# Patient Record
Sex: FEMALE | Race: WHITE | NOT HISPANIC OR LATINO
[De-identification: names, ages, dates, MRNs, and addresses within clinical notes are randomized per-mention and may not be internally consistent; named-entity substitution may affect disease eponyms.]

---

## 2017-02-08 ENCOUNTER — APPOINTMENT (OUTPATIENT)
Dept: INTERNAL MEDICINE | Facility: CLINIC | Age: 72
End: 2017-02-08

## 2017-02-08 VITALS — HEART RATE: 66 BPM | DIASTOLIC BLOOD PRESSURE: 74 MMHG | SYSTOLIC BLOOD PRESSURE: 148 MMHG | RESPIRATION RATE: 12 BRPM

## 2017-02-08 VITALS — BODY MASS INDEX: 20.4 KG/M2 | WEIGHT: 130 LBS | HEIGHT: 67 IN

## 2017-02-08 DIAGNOSIS — F17.200 NICOTINE DEPENDENCE, UNSPECIFIED, UNCOMPLICATED: ICD-10-CM

## 2017-02-08 DIAGNOSIS — H61.21 IMPACTED CERUMEN, RIGHT EAR: ICD-10-CM

## 2017-02-08 RX ORDER — VIT A/VIT C/VIT E/ZINC/COPPER 4296-226
CAPSULE ORAL DAILY
Qty: 30 | Refills: 0 | Status: ACTIVE | COMMUNITY
Start: 2017-02-08

## 2017-02-08 RX ORDER — PREDNISONE 2.5 MG/1
2.5 TABLET ORAL
Qty: 90 | Refills: 0 | Status: COMPLETED | COMMUNITY
Start: 2017-01-03

## 2017-02-08 RX ORDER — DIPHENOXYLATE HYDROCHLORIDE AND ATROPINE SULFATE 2.5; .025 MG/1; MG/1
2.5-0.025 TABLET ORAL
Qty: 30 | Refills: 0 | Status: ACTIVE | COMMUNITY
Start: 2016-11-15

## 2017-03-07 ENCOUNTER — RX RENEWAL (OUTPATIENT)
Age: 72
End: 2017-03-07

## 2017-11-21 ENCOUNTER — APPOINTMENT (OUTPATIENT)
Dept: INTERNAL MEDICINE | Facility: CLINIC | Age: 72
End: 2017-11-21
Payer: MEDICARE

## 2017-11-21 VITALS — HEIGHT: 67 IN | WEIGHT: 131 LBS | BODY MASS INDEX: 20.56 KG/M2

## 2017-11-21 VITALS — DIASTOLIC BLOOD PRESSURE: 72 MMHG | SYSTOLIC BLOOD PRESSURE: 138 MMHG

## 2017-11-21 VITALS — HEART RATE: 75 BPM | RESPIRATION RATE: 12 BRPM

## 2017-11-21 DIAGNOSIS — M06.9 RHEUMATOID ARTHRITIS, UNSPECIFIED: ICD-10-CM

## 2017-11-21 DIAGNOSIS — G89.29 OTHER CHRONIC PAIN: ICD-10-CM

## 2017-11-21 DIAGNOSIS — L71.9 ROSACEA, UNSPECIFIED: ICD-10-CM

## 2017-11-21 DIAGNOSIS — Z00.00 ENCOUNTER FOR GENERAL ADULT MEDICAL EXAMINATION W/OUT ABNORMAL FINDINGS: ICD-10-CM

## 2017-11-21 PROCEDURE — 99214 OFFICE O/P EST MOD 30 MIN: CPT

## 2017-11-21 RX ORDER — OXYBUTYNIN CHLORIDE 5 MG/1
5 TABLET ORAL
Qty: 90 | Refills: 0 | Status: DISCONTINUED | COMMUNITY
Start: 2016-09-10 | End: 2017-11-21

## 2017-11-21 RX ORDER — DULOXETINE HYDROCHLORIDE 60 MG/1
60 CAPSULE, DELAYED RELEASE PELLETS ORAL
Qty: 90 | Refills: 3 | Status: DISCONTINUED | COMMUNITY
Start: 2017-02-08 | End: 2017-11-21

## 2017-11-21 RX ORDER — CELECOXIB 200 MG/1
200 CAPSULE ORAL TWICE DAILY
Qty: 180 | Refills: 0 | Status: DISCONTINUED | COMMUNITY
Start: 2017-02-08 | End: 2017-11-21

## 2018-01-08 ENCOUNTER — RX RENEWAL (OUTPATIENT)
Age: 73
End: 2018-01-08

## 2018-04-30 ENCOUNTER — APPOINTMENT (OUTPATIENT)
Dept: INTERNAL MEDICINE | Facility: CLINIC | Age: 73
End: 2018-04-30
Payer: MEDICARE

## 2018-04-30 VITALS — RESPIRATION RATE: 12 BRPM | HEART RATE: 76 BPM | SYSTOLIC BLOOD PRESSURE: 136 MMHG | DIASTOLIC BLOOD PRESSURE: 64 MMHG

## 2018-04-30 VITALS — HEIGHT: 67 IN | BODY MASS INDEX: 20.4 KG/M2 | WEIGHT: 130 LBS

## 2018-04-30 DIAGNOSIS — N32.81 OVERACTIVE BLADDER: ICD-10-CM

## 2018-04-30 DIAGNOSIS — R39.9 UNSPECIFIED SYMPTOMS AND SIGNS INVOLVING THE GENITOURINARY SYSTEM: ICD-10-CM

## 2018-04-30 PROCEDURE — 99214 OFFICE O/P EST MOD 30 MIN: CPT

## 2018-04-30 RX ORDER — METRONIDAZOLE 7.5 MG/G
0.75 GEL TOPICAL
Qty: 45 | Refills: 0 | Status: DISCONTINUED | COMMUNITY
Start: 2016-10-04 | End: 2018-04-30

## 2018-04-30 RX ORDER — NITROFURANTOIN (MONOHYDRATE/MACROCRYSTALS) 25; 75 MG/1; MG/1
100 CAPSULE ORAL TWICE DAILY
Qty: 14 | Refills: 0 | Status: ACTIVE | COMMUNITY
Start: 2018-04-30 | End: 1900-01-01

## 2018-08-10 ENCOUNTER — RESULT REVIEW (OUTPATIENT)
Age: 73
End: 2018-08-10

## 2018-10-15 ENCOUNTER — APPOINTMENT (OUTPATIENT)
Dept: INTERNAL MEDICINE | Facility: CLINIC | Age: 73
End: 2018-10-15
Payer: MEDICARE

## 2018-10-15 VITALS — HEART RATE: 78 BPM | SYSTOLIC BLOOD PRESSURE: 124 MMHG | DIASTOLIC BLOOD PRESSURE: 64 MMHG | RESPIRATION RATE: 12 BRPM

## 2018-10-15 VITALS — HEIGHT: 67 IN | BODY MASS INDEX: 19.62 KG/M2 | WEIGHT: 125 LBS

## 2018-10-15 DIAGNOSIS — B96.89 PERSONAL HISTORY OF OTHER DISEASES OF THE RESPIRATORY SYSTEM: ICD-10-CM

## 2018-10-15 DIAGNOSIS — J31.0 CHRONIC RHINITIS: ICD-10-CM

## 2018-10-15 DIAGNOSIS — Z87.09 PERSONAL HISTORY OF OTHER DISEASES OF THE RESPIRATORY SYSTEM: ICD-10-CM

## 2018-10-15 DIAGNOSIS — I11.9 HYPERTENSIVE HEART DISEASE W/OUT HEART FAILURE: ICD-10-CM

## 2018-10-15 DIAGNOSIS — J44.9 CHRONIC OBSTRUCTIVE PULMONARY DISEASE, UNSPECIFIED: ICD-10-CM

## 2018-10-15 PROCEDURE — 99213 OFFICE O/P EST LOW 20 MIN: CPT

## 2018-10-15 RX ORDER — ALBUTEROL SULFATE 90 UG/1
108 (90 BASE) AEROSOL, METERED RESPIRATORY (INHALATION)
Qty: 1 | Refills: 1 | Status: ACTIVE | COMMUNITY
Start: 2018-10-15 | End: 1900-01-01

## 2018-10-15 RX ORDER — AMOXICILLIN 500 MG/1
500 TABLET, FILM COATED ORAL 3 TIMES DAILY
Qty: 21 | Refills: 0 | Status: ACTIVE | COMMUNITY
Start: 2018-10-15 | End: 1900-01-01

## 2018-10-15 NOTE — REVIEW OF SYSTEMS
[Chest Pain] : no chest pain [Shortness Of Breath] : no shortness of breath [Wheezing] : wheezing [Cough] : cough [Dyspnea on Exertion] : dyspnea on exertion [Constipation] : no constipation [Headache] : no headache [Dizziness] : no dizziness [Negative] : Heme/Lymph [FreeTextEntry2] : see HPI [FreeTextEntry6] : Smokes [de-identified] :  .

## 2018-10-15 NOTE — ASSESSMENT
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  Needs MDI and ABX.  WIll order Medrol if not improved.  Smoking counselled.  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  Saw Dr Cheng Marquez in past .  Declined PT.  Told eventually she will need a THR\par HCVD Ok on Norvasc and Metoprolol.  Declined a second BP measurement.  \par Occas PVC noted. \par Mood is depressed with various complaints.  Had Cymbalta in past and feels not effective..   \par OAB bothers patient.  Oxybutynin not effective.  Declines  MD bansal.  \par Rosacea ok with topical Flagyl  and Doxycycline prn\par Rhinitis is constant on Allegra.  Advise she try Zyrtec or Claritin.  Would avoid Sudafed due to HTN\par Cerumen R ear irrigated well in past.  \par

## 2018-10-15 NOTE — HISTORY OF PRESENT ILLNESS
[FreeTextEntry1] : 9-10 days c/o cough.  Says she feels "blah"..."no energy"  Also c/o diarrhea a few times one day last week.  None since.  No fever but had "hot and cold chills"  Dry cough.  Clear nasal drainage.  Using NyQuil "and it makes me sick to my stomach" and Robitussin.  Uses Allegra 180 qd.

## 2018-10-15 NOTE — HEALTH RISK ASSESSMENT
[] : No [No falls in past year] : Patient reported no falls in the past year [0] : 2) Feeling down, depressed, or hopeless: Not at all (0) [[id number]] : 0

## 2018-10-15 NOTE — PHYSICAL EXAM
[No Acute Distress] : no acute distress [Well Nourished] : well nourished [Well Developed] : well developed [Well-Appearing] : well-appearing [Normal Sclera/Conjunctiva] : normal sclera/conjunctiva [PERRL] : pupils equal round and reactive to light [EOMI] : extraocular movements intact [Normal Outer Ear/Nose] : the outer ears and nose were normal in appearance [Normal Oropharynx] : the oropharynx was normal [Normal TMs] : both tympanic membranes were normal [No Lymphadenopathy] : no lymphadenopathy [No Respiratory Distress] : no respiratory distress  [Clear to Auscultation] : lungs were clear to auscultation bilaterally [No Accessory Muscle Use] : no accessory muscle use [Normal Rate] : normal rate  [Regular Rhythm] : with a regular rhythm [Normal S1, S2] : normal S1 and S2 [No Carotid Bruits] : no carotid bruits [No Edema] : there was no peripheral edema [Soft] : abdomen soft [Non Tender] : non-tender [Normal Supraclavicular Nodes] : no supraclavicular lymphadenopathy [Normal Posterior Cervical Nodes] : no posterior cervical lymphadenopathy [Normal Anterior Cervical Nodes] : no anterior cervical lymphadenopathy [No CVA Tenderness] : no CVA  tenderness [No Focal Deficits] : no focal deficits [Alert and Oriented x3] : oriented to person, place, and time [de-identified] : Small L ear cerumen  No impaction  R clear [de-identified] : Poor BS  Faint bilat wheeze [de-identified] : R shoulder normal ROM [de-identified] : Redness on face c/w Rasacea

## 2018-11-20 ENCOUNTER — RX RENEWAL (OUTPATIENT)
Age: 73
End: 2018-11-20

## 2019-01-15 ENCOUNTER — RX RENEWAL (OUTPATIENT)
Age: 74
End: 2019-01-15

## 2020-02-18 ENCOUNTER — RX RENEWAL (OUTPATIENT)
Age: 75
End: 2020-02-18

## 2020-09-18 ENCOUNTER — TRANSCRIPTION ENCOUNTER (OUTPATIENT)
Age: 75
End: 2020-09-18

## 2020-10-04 ENCOUNTER — TRANSCRIPTION ENCOUNTER (OUTPATIENT)
Age: 75
End: 2020-10-04

## 2020-10-27 ENCOUNTER — TRANSCRIPTION ENCOUNTER (OUTPATIENT)
Age: 75
End: 2020-10-27

## 2020-11-18 ENCOUNTER — TRANSCRIPTION ENCOUNTER (OUTPATIENT)
Age: 75
End: 2020-11-18

## 2020-12-16 PROBLEM — Z87.09 HISTORY OF ACUTE BACTERIAL SINUSITIS: Status: RESOLVED | Noted: 2018-10-15 | Resolved: 2020-12-16

## 2021-05-12 ENCOUNTER — RX RENEWAL (OUTPATIENT)
Age: 76
End: 2021-05-12

## 2022-02-12 ENCOUNTER — TRANSCRIPTION ENCOUNTER (OUTPATIENT)
Age: 77
End: 2022-02-12

## 2022-02-17 ENCOUNTER — TRANSCRIPTION ENCOUNTER (OUTPATIENT)
Age: 77
End: 2022-02-17

## 2022-02-25 ENCOUNTER — TRANSCRIPTION ENCOUNTER (OUTPATIENT)
Age: 77
End: 2022-02-25

## 2022-03-07 ENCOUNTER — TRANSCRIPTION ENCOUNTER (OUTPATIENT)
Age: 77
End: 2022-03-07

## 2022-03-25 ENCOUNTER — TRANSCRIPTION ENCOUNTER (OUTPATIENT)
Age: 77
End: 2022-03-25

## 2022-04-01 ENCOUNTER — TRANSCRIPTION ENCOUNTER (OUTPATIENT)
Age: 77
End: 2022-04-01

## 2022-04-10 ENCOUNTER — TRANSCRIPTION ENCOUNTER (OUTPATIENT)
Age: 77
End: 2022-04-10

## 2022-04-15 ENCOUNTER — TRANSCRIPTION ENCOUNTER (OUTPATIENT)
Age: 77
End: 2022-04-15

## 2022-05-13 ENCOUNTER — NON-APPOINTMENT (OUTPATIENT)
Age: 77
End: 2022-05-13

## 2022-05-27 ENCOUNTER — NON-APPOINTMENT (OUTPATIENT)
Age: 77
End: 2022-05-27

## 2022-06-02 ENCOUNTER — NON-APPOINTMENT (OUTPATIENT)
Age: 77
End: 2022-06-02

## 2022-06-10 ENCOUNTER — NON-APPOINTMENT (OUTPATIENT)
Age: 77
End: 2022-06-10

## 2022-06-16 ENCOUNTER — NON-APPOINTMENT (OUTPATIENT)
Age: 77
End: 2022-06-16

## 2022-08-22 ENCOUNTER — APPOINTMENT (OUTPATIENT)
Dept: ORTHOPEDIC SURGERY | Facility: CLINIC | Age: 77
End: 2022-08-22

## 2022-08-22 VITALS — BODY MASS INDEX: 19.62 KG/M2 | WEIGHT: 125 LBS | HEIGHT: 67 IN

## 2022-08-22 DIAGNOSIS — Z98.890 OTHER SPECIFIED POSTPROCEDURAL STATES: ICD-10-CM

## 2022-08-22 PROCEDURE — 72070 X-RAY EXAM THORAC SPINE 2VWS: CPT

## 2022-08-22 PROCEDURE — 99214 OFFICE O/P EST MOD 30 MIN: CPT

## 2022-08-22 PROCEDURE — 72170 X-RAY EXAM OF PELVIS: CPT

## 2022-08-22 PROCEDURE — 72110 X-RAY EXAM L-2 SPINE 4/>VWS: CPT

## 2022-08-22 RX ORDER — CYCLOBENZAPRINE HYDROCHLORIDE 5 MG/1
5 TABLET, FILM COATED ORAL
Qty: 40 | Refills: 0 | Status: ACTIVE | COMMUNITY
Start: 2022-08-22 | End: 1900-01-01

## 2022-08-22 RX ORDER — METHYLPREDNISOLONE 4 MG/1
4 TABLET ORAL
Qty: 1 | Refills: 1 | Status: ACTIVE | COMMUNITY
Start: 2022-08-22 | End: 1900-01-01

## 2022-08-22 NOTE — DISCUSSION/SUMMARY
[de-identified] : possible new fracture - no clear finding on the xray\par she would like to try some medication first\par MDP/flexeril \par if not getting better will need MRi of the L spine - she can call to get that set up of the lumbar \par

## 2022-08-22 NOTE — HISTORY OF PRESENT ILLNESS
[Lower back] : lower back [8] : 8 [6] : 6 [Sharp] : sharp [Intermittent] : intermittent [Retired] : Work status: retired [de-identified] : 2/10/20: Here with pain in the right hip and in the back - mid and lower back pain - worse with standing for any length\par of time - no pain with rest\par xrays today:\par \par T spine 2 views - t9 compression deformity \par L spine 2 views - thoracic and lumbar scoliosis \par about 1 year of symptoms - worsening \par \par No chiro/acupuncture/PT\par tried OTC nsaid\par No surgery on the back \par distant injections in the back \par RA/HTN\par No loss of bb control\par No hx of cancer \par \par xrays reviewed:\par ap pelvis with rigth hip - mild degenerative changes\par \par 3/9/20: Here for fu s/p Kyphplasty at T9 on 3/2/20 - middle back is feeling better - lower back pain remains -\par requesting antiflammatory medication\par \par MRI T-spine 2/18/20\par 1. Moderate recent compression fracture of T9 with posterior bony retropulsion effacing the thecal sac at the T9 level\par without cord compression.\par 2. Scoliosis, exaggerated thoracic kyphosis, and mild multilevel degenerative disc disease.\par 3. Please see MRI of the lumbar spine performed on the same date.\par \par MRI T-spine 2/18/20\par 1. Scoliosis convex towards the right in the upper lumbar spine with diffuse multilevel degenerative disc disease and disc\par herniations asymmetric on the right in the mid-to-lower lumbar spine impinging the right exiting L3 nerve root at L3-L4,\par the right exiting L4 nerve root at L4-L5, and the right exiting L5 nerve root at L5-S1 with multilevel foraminal narrowing\par most prominent on the right in the mid-to-lower lumbar spine without acute fracture in the lumbar spine.\par 2. Please see MRI of the thoracic spine, which reveals evidence of moderate recent fracture at T9.\par \par 8/22/22: Here for f/u on the back; worsening pain over the last 2 weeks, she woke up with pain. Pain from both hips that radiates across the low back; no leg pain. No n/t. She has been taking tylenol arthritis medication with mild relief.\par \par xrays today:\par T spine - no obvious new compression fracture, cement in t9 \par L spine - scoliosis\par ap pelvis - NEGATIVE \par  [] : no

## 2022-09-01 ENCOUNTER — FORM ENCOUNTER (OUTPATIENT)
Age: 77
End: 2022-09-01

## 2022-09-02 ENCOUNTER — APPOINTMENT (OUTPATIENT)
Dept: MRI IMAGING | Facility: CLINIC | Age: 77
End: 2022-09-02

## 2022-09-02 PROCEDURE — 72148 MRI LUMBAR SPINE W/O DYE: CPT | Mod: MH

## 2022-10-07 ENCOUNTER — APPOINTMENT (OUTPATIENT)
Dept: ORTHOPEDIC SURGERY | Facility: CLINIC | Age: 77
End: 2022-10-07

## 2022-10-07 VITALS — BODY MASS INDEX: 19.62 KG/M2 | WEIGHT: 125 LBS | HEIGHT: 67 IN

## 2022-10-07 PROCEDURE — 99214 OFFICE O/P EST MOD 30 MIN: CPT

## 2022-10-07 NOTE — HISTORY OF PRESENT ILLNESS
[Lower back] : lower back [8] : 8 [6] : 6 [Sharp] : sharp [Intermittent] : intermittent [Retired] : Work status: retired [de-identified] : 2/10/20: Here with pain in the right hip and in the back - mid and lower back pain - worse with standing for any length\par of time - no pain with rest\par xrays today:\par \par T spine 2 views - t9 compression deformity \par L spine 2 views - thoracic and lumbar scoliosis \par about 1 year of symptoms - worsening \par \par No chiro/acupuncture/PT\par tried OTC nsaid\par No surgery on the back \par distant injections in the back \par RA/HTN\par No loss of bb control\par No hx of cancer \par \par xrays reviewed:\par ap pelvis with rigth hip - mild degenerative changes\par \par 3/9/20: Here for fu s/p Kyphplasty at T9 on 3/2/20 - middle back is feeling better - lower back pain remains -\par requesting antiflammatory medication\par \par MRI T-spine 2/18/20\par 1. Moderate recent compression fracture of T9 with posterior bony retropulsion effacing the thecal sac at the T9 level\par without cord compression.\par 2. Scoliosis, exaggerated thoracic kyphosis, and mild multilevel degenerative disc disease.\par 3. Please see MRI of the lumbar spine performed on the same date.\par \par MRI T-spine 2/18/20\par 1. Scoliosis convex towards the right in the upper lumbar spine with diffuse multilevel degenerative disc disease and disc\par herniations asymmetric on the right in the mid-to-lower lumbar spine impinging the right exiting L3 nerve root at L3-L4,\par the right exiting L4 nerve root at L4-L5, and the right exiting L5 nerve root at L5-S1 with multilevel foraminal narrowing\par most prominent on the right in the mid-to-lower lumbar spine without acute fracture in the lumbar spine.\par 2. Please see MRI of the thoracic spine, which reveals evidence of moderate recent fracture at T9.\par \par 8/22/22: Here for f/u on the back; worsening pain over the last 2 weeks, she woke up with pain. Pain from both hips that radiates across the low back; no leg pain. No n/t. She has been taking tylenol arthritis medication with mild relief.\par \par xrays today:\par T spine - no obvious new compression fracture, cement in t9 \par L spine - scoliosis\par ap pelvis - NEGATIVE \par \par 10/7/22: HEre for fu - plan at last was "possible new fracture - no clear finding on the xray\par she would like to try some medication first\par MDP/flexeril \par if not getting better will need MRi of the L spine - she can call to get that set up of the lumbar" - overall the pain in the middle/lower back continues - no fevers/chills \par not much relief with the medicatoin\par \par MRi L spine -  Development of bone marrow edema in the endplates adjacent to the L1-2 disc as well as loss of the normal \par cortical integrity of the endplates anteriorly and a small amount of paraspinal edema. This pattern can be seen in \par patients with both discitis and as result of progression of degenerative disc disease. Correlation with clinical \par parameters (ESR and C-reactive protein) are recommended. In addition, a CT scan of the lumbar spine without \par contrast may be of benefit.\par Disc bulges and/or disc herniations at L2-3 and L3-4 without stenosis or nerve root compression.\par Disc bulge with posterior element degenerative changes at L4-5 with mild central stenosis.\par Disc herniation at L5-S1 with mild compression of the right S1 nerve root.\par  [] : no [FreeTextEntry1] : lower back [de-identified] : OTC arthritis Rx and NSAIDs

## 2022-10-07 NOTE — DISCUSSION/SUMMARY
[de-identified] : reviewed the imaging with her\par discussion of tx options \par we discussed the MRi report - given her age/symptoms profile would have very low suspicion for this being an infectious process - would hold on ordering labs at this point - much more likely degnerative in nature \par tramadol will send \par shes taking tylenol arthrosiits\par fu 4 weeks \par

## 2022-12-16 ENCOUNTER — APPOINTMENT (OUTPATIENT)
Dept: ORTHOPEDIC SURGERY | Facility: CLINIC | Age: 77
End: 2022-12-16

## 2022-12-16 PROCEDURE — 99214 OFFICE O/P EST MOD 30 MIN: CPT

## 2022-12-16 PROCEDURE — 72100 X-RAY EXAM L-S SPINE 2/3 VWS: CPT

## 2022-12-16 PROCEDURE — 72170 X-RAY EXAM OF PELVIS: CPT

## 2022-12-16 NOTE — DISCUSSION/SUMMARY
[de-identified] : reviewed the case with her \par the back pain has been really bad for her since she had the hip surgery for the fracture \par high suspicion for compression fracture - indicated for updated MRI T and l spine eval for fraxture\par fu after mris \par \par if not fracture - facet blocks likely next step \par

## 2022-12-16 NOTE — HISTORY OF PRESENT ILLNESS
[Lower back] : lower back [8] : 8 [6] : 6 [Sharp] : sharp [Intermittent] : intermittent [Retired] : Work status: retired [de-identified] : 2/10/20: Here with pain in the right hip and in the back - mid and lower back pain - worse with standing for any length\par of time - no pain with rest\par xrays today:\par \par T spine 2 views - t9 compression deformity \par L spine 2 views - thoracic and lumbar scoliosis \par about 1 year of symptoms - worsening \par \par No chiro/acupuncture/PT\par tried OTC nsaid\par No surgery on the back \par distant injections in the back \par RA/HTN\par No loss of bb control\par No hx of cancer \par \par xrays reviewed:\par ap pelvis with rigth hip - mild degenerative changes\par \par 3/9/20: Here for fu s/p Kyphplasty at T9 on 3/2/20 - middle back is feeling better - lower back pain remains -\par requesting antiflammatory medication\par \par MRI T-spine 2/18/20\par 1. Moderate recent compression fracture of T9 with posterior bony retropulsion effacing the thecal sac at the T9 level\par without cord compression.\par 2. Scoliosis, exaggerated thoracic kyphosis, and mild multilevel degenerative disc disease.\par 3. Please see MRI of the lumbar spine performed on the same date.\par \par MRI T-spine 2/18/20\par 1. Scoliosis convex towards the right in the upper lumbar spine with diffuse multilevel degenerative disc disease and disc\par herniations asymmetric on the right in the mid-to-lower lumbar spine impinging the right exiting L3 nerve root at L3-L4,\par the right exiting L4 nerve root at L4-L5, and the right exiting L5 nerve root at L5-S1 with multilevel foraminal narrowing\par most prominent on the right in the mid-to-lower lumbar spine without acute fracture in the lumbar spine.\par 2. Please see MRI of the thoracic spine, which reveals evidence of moderate recent fracture at T9.\par \par 8/22/22: Here for f/u on the back; worsening pain over the last 2 weeks, she woke up with pain. Pain from both hips that radiates across the low back; no leg pain. No n/t. She has been taking tylenol arthritis medication with mild relief.\par \par xrays today:\par T spine - no obvious new compression fracture, cement in t9 \par L spine - scoliosis\par ap pelvis - NEGATIVE \par \par 10/7/22: HEre for fu - plan at last was "possible new fracture - no clear finding on the xray\par she would like to try some medication first\par MDP/flexeril \par if not getting better will need MRi of the L spine - she can call to get that set up of the lumbar" - overall the pain in the middle/lower back continues - no fevers/chills \par not much relief with the medicatoin\par \par MRi L spine -  Development of bone marrow edema in the endplates adjacent to the L1-2 disc as well as loss of the normal \par cortical integrity of the endplates anteriorly and a small amount of paraspinal edema. This pattern can be seen in \par patients with both discitis and as result of progression of degenerative disc disease. Correlation with clinical \par parameters (ESR and C-reactive protein) are recommended. In addition, a CT scan of the lumbar spine without \par contrast may be of benefit.\par Disc bulges and/or disc herniations at L2-3 and L3-4 without stenosis or nerve root compression.\par Disc bulge with posterior element degenerative changes at L4-5 with mild central stenosis.\par Disc herniation at L5-S1 with mild compression of the right S1 nerve root.\par \par 12/16/22: Here for fu - plan at last was "reviewed the imaging with her\par discussion of tx options \par we discussed the MRi report - given her age/symptoms profile would have very low suspicion for this being an infectious process - would hold on ordering labs at this point - much more likely degnerative in nature \par tramadol will send \par shes taking tylenol arthrosiits\par fu 4 weeks " - the pain now worse cant really stand for any length of time - its in the lwoer back and wraps around to the right hip \par \par xrays today:\par L spine - possible new fracture T11?\par AP PELVIS-  left hip aleksey \par \par cant stand for more than abuot 15 mins at this point \par  [] : no [FreeTextEntry1] : lower back [de-identified] : OTC arthritis Rx and NSAIDs

## 2022-12-19 ENCOUNTER — FORM ENCOUNTER (OUTPATIENT)
Age: 77
End: 2022-12-19

## 2022-12-20 ENCOUNTER — APPOINTMENT (OUTPATIENT)
Dept: MRI IMAGING | Facility: CLINIC | Age: 77
End: 2022-12-20

## 2022-12-20 PROCEDURE — 72146 MRI CHEST SPINE W/O DYE: CPT | Mod: MH

## 2022-12-20 PROCEDURE — 72148 MRI LUMBAR SPINE W/O DYE: CPT | Mod: MH

## 2023-01-20 ENCOUNTER — APPOINTMENT (OUTPATIENT)
Dept: ORTHOPEDIC SURGERY | Facility: CLINIC | Age: 78
End: 2023-01-20
Payer: MEDICARE

## 2023-01-20 VITALS — HEIGHT: 67 IN | WEIGHT: 125 LBS | BODY MASS INDEX: 19.62 KG/M2

## 2023-01-20 DIAGNOSIS — M51.26 OTHER INTERVERTEBRAL DISC DISPLACEMENT, LUMBAR REGION: ICD-10-CM

## 2023-01-20 DIAGNOSIS — M54.6 PAIN IN THORACIC SPINE: ICD-10-CM

## 2023-01-20 DIAGNOSIS — S22.070D WEDGE COMPRESSION FRACTURE OF T9-T10 VERTEBRA, SUBSEQUENT ENCOUNTER FOR FRACTURE WITH ROUTINE HEALING: ICD-10-CM

## 2023-01-20 DIAGNOSIS — M81.0 AGE-RELATED OSTEOPOROSIS W/OUT CURRENT PATHOLOGICAL FRACTURE: ICD-10-CM

## 2023-01-20 PROCEDURE — 99214 OFFICE O/P EST MOD 30 MIN: CPT

## 2023-01-20 RX ORDER — TRAMADOL HYDROCHLORIDE 50 MG/1
50 TABLET, COATED ORAL 3 TIMES DAILY
Qty: 60 | Refills: 0 | Status: ACTIVE | COMMUNITY
Start: 2022-10-07 | End: 1900-01-01

## 2023-01-20 NOTE — DISCUSSION/SUMMARY
[de-identified] : reviewed the MRi T and L spine \par discussion of tx options \par no target for surgery \par pain management a good option\par tramadol\par \par

## 2023-01-20 NOTE — HISTORY OF PRESENT ILLNESS
[Lower back] : lower back [8] : 8 [6] : 6 [Sharp] : sharp [Intermittent] : intermittent [Retired] : Work status: retired [de-identified] : 2/10/20: Here with pain in the right hip and in the back - mid and lower back pain - worse with standing for any length\par of time - no pain with rest\par xrays today:\par \par T spine 2 views - t9 compression deformity \par L spine 2 views - thoracic and lumbar scoliosis \par about 1 year of symptoms - worsening \par \par No chiro/acupuncture/PT\par tried OTC nsaid\par No surgery on the back \par distant injections in the back \par RA/HTN\par No loss of bb control\par No hx of cancer \par \par xrays reviewed:\par ap pelvis with rigth hip - mild degenerative changes\par \par 3/9/20: Here for fu s/p Kyphplasty at T9 on 3/2/20 - middle back is feeling better - lower back pain remains -\par requesting antiflammatory medication\par \par MRI T-spine 2/18/20\par 1. Moderate recent compression fracture of T9 with posterior bony retropulsion effacing the thecal sac at the T9 level\par without cord compression.\par 2. Scoliosis, exaggerated thoracic kyphosis, and mild multilevel degenerative disc disease.\par 3. Please see MRI of the lumbar spine performed on the same date.\par \par MRI T-spine 2/18/20\par 1. Scoliosis convex towards the right in the upper lumbar spine with diffuse multilevel degenerative disc disease and disc\par herniations asymmetric on the right in the mid-to-lower lumbar spine impinging the right exiting L3 nerve root at L3-L4,\par the right exiting L4 nerve root at L4-L5, and the right exiting L5 nerve root at L5-S1 with multilevel foraminal narrowing\par most prominent on the right in the mid-to-lower lumbar spine without acute fracture in the lumbar spine.\par 2. Please see MRI of the thoracic spine, which reveals evidence of moderate recent fracture at T9.\par \par 8/22/22: Here for f/u on the back; worsening pain over the last 2 weeks, she woke up with pain. Pain from both hips that radiates across the low back; no leg pain. No n/t. She has been taking tylenol arthritis medication with mild relief.\par \par xrays today:\par T spine - no obvious new compression fracture, cement in t9 \par L spine - scoliosis\par ap pelvis - NEGATIVE \par \par 10/7/22: HEre for fu - plan at last was "possible new fracture - no clear finding on the xray\par she would like to try some medication first\par MDP/flexeril \par if not getting better will need MRi of the L spine - she can call to get that set up of the lumbar" - overall the pain in the middle/lower back continues - no fevers/chills \par not much relief with the medicatoin\par \par MRi L spine -  Development of bone marrow edema in the endplates adjacent to the L1-2 disc as well as loss of the normal \par cortical integrity of the endplates anteriorly and a small amount of paraspinal edema. This pattern can be seen in \par patients with both discitis and as result of progression of degenerative disc disease. Correlation with clinical \par parameters (ESR and C-reactive protein) are recommended. In addition, a CT scan of the lumbar spine without \par contrast may be of benefit.\par Disc bulges and/or disc herniations at L2-3 and L3-4 without stenosis or nerve root compression.\par Disc bulge with posterior element degenerative changes at L4-5 with mild central stenosis.\par Disc herniation at L5-S1 with mild compression of the right S1 nerve root.\par \par 12/16/22: Here for fu - plan at last was "reviewed the imaging with her\par discussion of tx options \par we discussed the MRi report - given her age/symptoms profile would have very low suspicion for this being an infectious process - would hold on ordering labs at this point - much more likely degnerative in nature \par tramadol will send \par shes taking tylenol arthrosiits\par fu 4 weeks " - the pain now worse cant really stand for any length of time - its in the lwoer back and wraps around to the right hip \par \par xrays today:\par L spine - possible new fracture T11?\par AP PELVIS-  left hip aleksey \par \par cant stand for more than abuot 15 mins at this point \par \par \par 01/20/2023: Here for MRI review. Plan at last was "reviewed the case with her \par the back pain has been really bad for her since she had the hip surgery for the fracture \par high suspicion for compression fracture - indicated for updated MRI T and l spine eval for fraxture\par fu after mris \par \par if not fracture - facet blocks likely next step" - pain remains in the lower back - more on the right than the left \par \par L spine MRI\par Reduction in the extent of bone marrow edema in the endplates adjacent to the L1-2 disc when compared with \par most recent prior MRI. Presumably, these represented reactive Modic type I endplate changes that have \par decreased in extent when compared with the prior study.\par Disc bulges and/or disc herniations at L2-3 and L3-4 without stenosis or nerve root compression.\par Disc bulge with posterior element degenerative changes at L4-5 with mild central stenosis.\par Disc herniation at L5-S1 with mild compression of the right S1 nerve root.\par Modic type I endplate changes adjacent to the L5-S1 disc.\par \par T spine MRI\par Mild-moderate degenerative disc disease throughout the thoracic spine with a small disc herniation at T8-9 \par without spinal cord or nerve root compression that has decreased in size from the most recent prior MRI.\par Chronic T4, T9 and T11 fractures as detailed above.\par No acute or subacute fractures are seen on this study.\par  [] : no [FreeTextEntry1] : lower back [de-identified] : XR MRI [de-identified] : OTC arthritis Rx and NSAIDs

## 2023-01-30 ENCOUNTER — APPOINTMENT (OUTPATIENT)
Dept: PAIN MANAGEMENT | Facility: CLINIC | Age: 78
End: 2023-01-30
Payer: MEDICARE

## 2023-01-30 VITALS — WEIGHT: 122 LBS | HEIGHT: 67 IN | BODY MASS INDEX: 19.15 KG/M2

## 2023-01-30 PROCEDURE — 99214 OFFICE O/P EST MOD 30 MIN: CPT

## 2023-01-30 NOTE — PHYSICAL EXAM
[de-identified] : Constitutional; Appears well, no apparent distress\par Ability to communicate: Normal \par Respiratory: non-labored breathing\par Skin: No rash noted\par Head: Normocephalic, atraumatic\par Neck: no visible thyroid enlargement\par Eyes: Extraocular movements intact\par Neurologic: Alert and oriented x3\par Psychiatric: normal mood, affect and behavior \par \par  [Extension] : extension [] : light touch intact throughout both lower extremities

## 2023-01-30 NOTE — HISTORY OF PRESENT ILLNESS
[Lower back] : lower back [10] : 10 [0] : 0 [Dull/Aching] : dull/aching [Constant] : constant [Rest] : rest [Standing] : standing [] : no [FreeTextEntry1] : right  [de-identified] : july 2021 [de-identified] : lul  [de-identified] : L MRI

## 2023-01-30 NOTE — DISCUSSION/SUMMARY
[de-identified] : After discussing various treatment options with the patient including but not limited to oral medications, physical therapy, exercise modalities as well as interventional spinal injections, we have decided with the following plan:\par \par - Continue Home exercises, stretching, activity modification, physical therapy, and conservative care.\par - MRI report and/or images was reviewed and discussed with the patient.\par - Recommend First Diagnostic RIGHT L2,3,4,5 Medial Branch Blocks under fluoroscopic guidance with image.\par - Patient presents with axial lumbar pain that has not responded to 3 months of conservative therapy including physical therapy or NSAID therapy.  The pain is interfering with activities of daily living and functionality. There is no radicular pain. The pain is exacerbated by facet loading / positive Kemps maneuver which is defined by pain reproduction with extension and rotation of the lumbar spine to the affected side.  The patient has not had a vertebral fusion at the levels of the proposed treatment.  There is no unexplained neurologic deficit.  There is no history of systemic infection, unstable medical condition, bleeding tendency, or local infection.  The injection is being performed to diagnose the facet joint as the source of the individual's pain, in preparation for a radiofrequency ablation. \par - The risks, benefits, contents and alternatives to injection were explained in full to the patient.  Risks outlined include but are not limited to infection, sepsis, bleeding, post-dural puncture headache, nerve damage, temporary increase in pain, syncopal episode, failure to resolve symptoms, allergic reaction, symptom recurrence, and elevation of blood sugar in diabetics. Cortisone may cause immunosuppression.  Patient understands the risks.  All questions were answered.  After discussion of options, patient requested an injection.  Information regarding the injection was given to the patient.  Which medications to stop prior to the injection was explained to the patient as well.\par - Follow up in 1-2 weeks post injection for re-evaluation.\par \par

## 2023-02-21 ENCOUNTER — APPOINTMENT (OUTPATIENT)
Dept: PAIN MANAGEMENT | Facility: CLINIC | Age: 78
End: 2023-02-21
Payer: MEDICARE

## 2023-02-21 PROCEDURE — 64494 INJ PARAVERT F JNT L/S 2 LEV: CPT | Mod: RT

## 2023-02-21 PROCEDURE — 64495 INJ PARAVERT F JNT L/S 3 LEV: CPT | Mod: RT

## 2023-02-21 PROCEDURE — J3490M: CUSTOM | Mod: NC

## 2023-02-21 PROCEDURE — 64493 INJ PARAVERT F JNT L/S 1 LEV: CPT | Mod: RT

## 2023-02-21 NOTE — PROCEDURE
[FreeTextEntry3] : Date of Service: 02/21/2023 \par \par Account: [id number]\par \par Patient: MAURIZIO FERREIRA \par \par YOB: 1945\par \par Age: 77 year\par \par \par Surgeon:                                                         Anthony Arreaga D.O.\par \par Assistant:                                                        None\par \par Pre-Operative Diagnosis:                            Spondylosis of lumbar region without myelopathy or radiculopathy (M47.816)\par \par Post-Operative Diagnosis:                          Same\par \par Procedure:                                                     Right L2, L3, L4, L5 Medial Branch Blocks \par \par Anesthesia:                                                    Local with Sedation and Nasal Oxygen\par \par \par This procedure was carried out using fluoroscopic guidance.  The risks and benefits of the procedure were discussed extensively with the patient.  The Consent of the patient was obtained and the following procedure was performed.\par \par The patient was placed in the prone position.  The patient's back was prepped and draped in a sterile fashion. A timeout was performed with all essential staff present and the site and side were verified. The lumbar vertebral bodies were identified and the fluoroscope right obliqued to approximately 30 degrees to reveal good "annabella-dog" anatomical view.  The junction of the superior articulate process and transverse process at the L3, L4, and L5 levels were identified and marked. The skin at these target points was then localized using 1 cc of 1% Lidocaine without epinephrine at each injection site.  A spinal needle was then introduced and advanced at these three levels to the above target points at the junction of the SAP and transverse processes until bone was contacted.  Fluoroscope then focused on the right sacral ala on AP view, and marked at this point.  The skin and subcutaneous structures were localized using 1 cc of 1.0 % lidocaine without epinephrine.  A spinal needle was then advanced under fluoroscopic guidance until bone was contacted at the ala.  After negative aspiration for heme and CSF, an injectate of 1.5 cc 0.25% bupivacaine plus 1.5 mg of betamethasone was injected at each of the four injection sites.\par \par The needles were then removed and pressure was applied.  Anesthesia personnel were present throughout the procedure.\par \par The patient was instructed to apply ice over the injection site for twenty minutes every two hours for the next 24 to 48 hours.  The patient was also instructed to contact me immediately if there were any problems.\par \par Anthony Arreaga D.O.\par

## 2023-02-27 ENCOUNTER — APPOINTMENT (OUTPATIENT)
Dept: PAIN MANAGEMENT | Facility: CLINIC | Age: 78
End: 2023-02-27
Payer: MEDICARE

## 2023-02-27 VITALS — HEIGHT: 67 IN | BODY MASS INDEX: 19.15 KG/M2 | WEIGHT: 122 LBS

## 2023-02-27 PROCEDURE — 99214 OFFICE O/P EST MOD 30 MIN: CPT

## 2023-02-27 NOTE — PHYSICAL EXAM
[Extension] : extension [de-identified] : Constitutional; Appears well, no apparent distress\par Ability to communicate: Normal \par Respiratory: non-labored breathing\par Skin: No rash noted\par Head: Normocephalic, atraumatic\par Neck: no visible thyroid enlargement\par Eyes: Extraocular movements intact\par Neurologic: Alert and oriented x3\par Psychiatric: normal mood, affect and behavior \par \par  [] : negative Stearns maneuver facet loading

## 2023-02-27 NOTE — DISCUSSION/SUMMARY
[de-identified] : After discussing various treatment options with the patient including but not limited to oral medications, physical therapy, exercise modalities as well as interventional spinal injections, we have decided with the following plan:\par \par - Continue Home exercises, stretching, activity modification, physical therapy, and conservative care.\par - MRI report and/or images was reviewed and discussed with the patient.\par - Recommend Second Diagnostic RIGHT L2,3,4,5 Medial Branch Blocks under fluoroscopic guidance with image. First diagnostic MBBs resulted in >80% relief and improvement of ADLs for the duration of the local anesthetic \par - Patient presents with axial lumbar pain that has not responded to 3 months of conservative therapy including physical therapy or NSAID therapy.  The pain is interfering with activities of daily living and functionality. There is no radicular pain. The pain is exacerbated by facet loading / positive Kemps maneuver which is defined by pain reproduction with extension and rotation of the lumbar spine to the affected side.  The patient has not had a vertebral fusion at the levels of the proposed treatment.  There is no unexplained neurologic deficit.  There is no history of systemic infection, unstable medical condition, bleeding tendency, or local infection.  The injection is being performed to diagnose the facet joint as the source of the individual's pain, in preparation for a radiofrequency ablation. \par - The risks, benefits, contents and alternatives to injection were explained in full to the patient.  Risks outlined include but are not limited to infection, sepsis, bleeding, post-dural puncture headache, nerve damage, temporary increase in pain, syncopal episode, failure to resolve symptoms, allergic reaction, symptom recurrence, and elevation of blood sugar in diabetics. Cortisone may cause immunosuppression.  Patient understands the risks.  All questions were answered.  After discussion of options, patient requested an injection.  Information regarding the injection was given to the patient.  Which medications to stop prior to the injection was explained to the patient as well.\par - Follow up in 1-2 weeks post injection for re-evaluation.

## 2023-02-27 NOTE — HISTORY OF PRESENT ILLNESS
[Lower back] : lower back [0] : 0 [Dull/Aching] : dull/aching [Rest] : rest [Standing] : standing [5] : 5 [Intermittent] : intermittent [Injection therapy] : injection therapy [] : no [FreeTextEntry1] : right  [de-identified] : july 2021 [de-identified] : lul  [de-identified] : L MRI  [TWNoteComboBox1] : 100%

## 2023-03-21 ENCOUNTER — APPOINTMENT (OUTPATIENT)
Dept: PAIN MANAGEMENT | Facility: CLINIC | Age: 78
End: 2023-03-21
Payer: MEDICARE

## 2023-03-21 PROCEDURE — J3490M: CUSTOM | Mod: NC

## 2023-03-21 PROCEDURE — 64495 INJ PARAVERT F JNT L/S 3 LEV: CPT | Mod: RT

## 2023-03-21 PROCEDURE — 64493 INJ PARAVERT F JNT L/S 1 LEV: CPT | Mod: RT

## 2023-03-21 PROCEDURE — 64494 INJ PARAVERT F JNT L/S 2 LEV: CPT | Mod: RT

## 2023-03-21 NOTE — PROCEDURE
[FreeTextEntry3] : Date of Service: 03/21/2023 \par \par Account: [id number]\par \par Patient: MAURIZIO FERREIRA \par \par YOB: 1945\par \par Age: 77 year\par \par \par Surgeon:                                                         Anthony Arreaga D.O.\par \par Assistant:                                                        None\par \par Pre-Operative Diagnosis:                            Spondylosis of lumbar region without myelopathy or radiculopathy (M47.816)\par \par Post-Operative Diagnosis:                          Same\par \par Procedure:                                                     Right L2, L3, L4, L5 Medial Branch Blocks \par \par Anesthesia:                                                    Local with Sedation and Nasal Oxygen\par \par \par This procedure was carried out using fluoroscopic guidance.  The risks and benefits of the procedure were discussed extensively with the patient.  The Consent of the patient was obtained and the following procedure was performed.\par \par The patient was placed in the prone position.  The patient's back was prepped and draped in a sterile fashion. A timeout was performed with all essential staff present and the site and side were verified. The lumbar vertebral bodies were identified and the fluoroscope right obliqued to approximately 30 degrees to reveal good "annabella-dog" anatomical view.  The junction of the superior articulate process and transverse process at the L3, L4, and L5 levels were identified and marked. The skin at these target points was then localized using 1 cc of 1% Lidocaine without epinephrine at each injection site.  A spinal needle was then introduced and advanced at these three levels to the above target points at the junction of the SAP and transverse processes until bone was contacted.  Fluoroscope then focused on the right sacral ala on AP view, and marked at this point.  The skin and subcutaneous structures were localized using 1 cc of 1.0 % lidocaine without epinephrine.  A spinal needle was then advanced under fluoroscopic guidance until bone was contacted at the ala.  After negative aspiration for heme and CSF, an injectate of 1.5 cc 0.25% bupivacaine plus 1.5 mg of betamethasone was injected at each of the four injection sites.\par \par The needles were then removed and pressure was applied.  Anesthesia personnel were present throughout the procedure.\par \par The patient was instructed to apply ice over the injection site for twenty minutes every two hours for the next 24 to 48 hours.  The patient was also instructed to contact me immediately if there were any problems.\par \par Anthony Arreaga D.O.\par

## 2023-03-27 ENCOUNTER — APPOINTMENT (OUTPATIENT)
Dept: PAIN MANAGEMENT | Facility: CLINIC | Age: 78
End: 2023-03-27
Payer: MEDICARE

## 2023-03-27 VITALS — WEIGHT: 122 LBS | BODY MASS INDEX: 19.15 KG/M2 | HEIGHT: 67 IN

## 2023-03-27 DIAGNOSIS — M54.16 RADICULOPATHY, LUMBAR REGION: ICD-10-CM

## 2023-03-27 PROCEDURE — 99214 OFFICE O/P EST MOD 30 MIN: CPT

## 2023-03-27 NOTE — DISCUSSION/SUMMARY
[de-identified] : After discussing various treatment options with the patient including but not limited to oral medications, physical therapy, exercise modalities as well as interventional spinal injections, we have decided with the following plan:\par \par - Continue Home exercises, stretching, activity modification, physical therapy, and conservative care.\par - MRI report and/or images was reviewed and discussed with the patient.\par - Recommend Right L2,3,4,5 radiofrequency ablation under under fluoroscopic guidance with image. \par - Patient has lumbosacral axial pain that is consistent with facet joint pathology. Two diagnostic blocks of the medial branch nerves with local anesthetic was performed and has resulted in at least a 80% reduction in pain for the duration of the specific local anesthetic. The pain is not radicular. There is no prior spinal fusion surgery at the level targeted.  The pain has failed to respond to three months of conservative therapy.\par - The risks, benefits, contents and alternatives to injection were explained in full to the patient.  Risks outlined include but are not limited to infection, sepsis, bleeding, post-dural puncture headache, nerve damage, temporary increase in pain, syncopal episode, failure to resolve symptoms, allergic reaction, symptom recurrence, and elevation of blood sugar in diabetics. Cortisone may cause immunosuppression.  Patient understands the risks.  All questions were answered.  After discussion of options, patient requested an injection.  Information regarding the injection was given to the patient.  Which medications to stop prior to the injection was explained to the patient as well.\par - Follow up in 1-2 weeks post injection for re-evaluation.\par - Will order right hip Xray.

## 2023-03-27 NOTE — PHYSICAL EXAM
[Extension] : extension [de-identified] : Constitutional; Appears well, no apparent distress\par Ability to communicate: Normal \par Respiratory: non-labored breathing\par Skin: No rash noted\par Head: Normocephalic, atraumatic\par Neck: no visible thyroid enlargement\par Eyes: Extraocular movements intact\par Neurologic: Alert and oriented x3\par Psychiatric: normal mood, affect and behavior \par \par  [] : negative Stearns maneuver facet loading [de-identified] : +FADIR on the right

## 2023-03-27 NOTE — HISTORY OF PRESENT ILLNESS
[Lower back] : lower back [5] : 5 [0] : 0 [Dull/Aching] : dull/aching [Intermittent] : intermittent [Rest] : rest [Injection therapy] : injection therapy [Standing] : standing [] : no [FreeTextEntry1] : right  [de-identified] : july 2021 [de-identified] : lul  [de-identified] : L MRI  [TWNoteComboBox1] : 50%

## 2023-04-24 ENCOUNTER — APPOINTMENT (OUTPATIENT)
Dept: PAIN MANAGEMENT | Facility: CLINIC | Age: 78
End: 2023-04-24
Payer: MEDICARE

## 2023-04-24 PROCEDURE — 64636Z: CUSTOM | Mod: RT,59

## 2023-04-24 PROCEDURE — 64635 DESTROY LUMB/SAC FACET JNT: CPT | Mod: RT

## 2023-04-24 PROCEDURE — J3490M: CUSTOM | Mod: NC

## 2023-04-24 NOTE — PROCEDURE
[FreeTextEntry3] : Date of Service: 04/24/2023 \par \par Account: [id number]\par \par Patient: MAURIZIO FERREIRA \par \par YOB: 1945\par \par Age: 77 year\par \par Surgeon:  Anthony Arreaga D.O. \par \par Assistant:  None\par \par Pre-Operative Diagnosis: Spondylosis of Lumbar Region without Myelopathy or Radiculopathy (M47.816)     \par \par Post-Operative Diagnosis: Spondylosis of Lumbar Region without Myelopathy or Radiculopathy (M47.816)     \par \par Procedure:  Right L3-L4, L4-5, L5-S1 facet joint radiofrequency ablation with fluoroscopic guidance.\par \par Anesthesia: Local with MAC\par \par \par This procedure was carried out using fluoroscopic guidance.  The risks and benefits of the procedure were discussed extensively with the patient.  The consent of the patient was obtained and the following procedure was performed. The patient was placed in the prone position on the fluoroscopy table and the area was prepped and draped in a sterile fashion.  A timeout was performed with all essential staff present and the site and side were verified.\par \par Under A/P visualization, the right sacral ala was identified and marked.  Using a 25 gauge needle the skin and subcutaneous structures at this point were localized with approximately 3 mL of 1% Lidocaine.  After this, an 18-gauge 10cm radiofrequency needle with a 10mm curved active tip was inserted under fluoroscopic visualization until the needle made contact with the sacral ala.  \par \par The fluoroscope was then redirected under A/P view to visualize the right L3-L4, L4-L5, L5-S1 facet joint levels. The camera was obliqued to approximately 30 degrees to reveal good Sumeet dog anatomical view. The junction of the superior articulate process and transverse process at the targeted levels were then identified and marked. Skin and subcutaneous structures were then anesthetized with approximately 3 mL of 1% Lidocaine at each of these levels.  After this, an 18-gauge 10cm radiofrequency needle with a 10mm curved active tip then advanced until the junction at the SAP and transverse process was met at each level.  Both ipsilateral oblique and lateral fluoroscopic views were obtained in order to advance the needle to the intended targets which was at the junction of the SAP and transverse process.  \par \par At all the treated levels, sensory stimulation was performed at 50 Hz not exceeding 1.0 volt and motor stimulation testing at 2 Hz not exceeding 3.0 volts.  There was no abnormal sensory or motor stimulation observed or reported by the patient most notably in the lower extremities.\par \par Each treated level was then anesthetized with approximately 1 ml of 0.25% bupivacaine plus 1.5mg of betamethasone. After which each area was then ablated at 80 degrees centigrade for 90 seconds each. Patient felt no pain reproduction during the ablation procedure. \par \par The needles were subsequently removed.  Vital signs remained normal.  Pulse oximeter was used throughout the procedure and the patient's pulse and oxygen saturation remained within normal limits.  The patient tolerated the procedure well.  There were no complications.  The patient was instructed to apply ice over the injection sites for twenty minutes every two hours for the next 24 to 48 hours.\par \par Anthony Arreaga D.O.\par

## 2023-05-08 ENCOUNTER — APPOINTMENT (OUTPATIENT)
Dept: PAIN MANAGEMENT | Facility: CLINIC | Age: 78
End: 2023-05-08
Payer: MEDICARE

## 2023-05-08 VITALS — BODY MASS INDEX: 19.15 KG/M2 | HEIGHT: 67 IN | WEIGHT: 122 LBS

## 2023-05-08 PROCEDURE — 99213 OFFICE O/P EST LOW 20 MIN: CPT

## 2023-05-08 NOTE — HISTORY OF PRESENT ILLNESS
[Lower back] : lower back [0] : 0 [Dull/Aching] : dull/aching [Intermittent] : intermittent [Rest] : rest [Injection therapy] : injection therapy [Standing] : standing [3] : 3 [] : no [FreeTextEntry1] : right  [de-identified] : july 2021 [de-identified] : lul  [de-identified] : L MRI  [TWNoteComboBox1] : 70%

## 2023-05-08 NOTE — PHYSICAL EXAM
[Extension] : extension [de-identified] : Constitutional; Appears well, no apparent distress\par Ability to communicate: Normal \par Respiratory: non-labored breathing\par Skin: No rash noted\par Head: Normocephalic, atraumatic\par Neck: no visible thyroid enlargement\par Eyes: Extraocular movements intact\par Neurologic: Alert and oriented x3\par Psychiatric: normal mood, affect and behavior \par \par  [] : negative Stearns maneuver facet loading [de-identified] : +FADIR on the right

## 2023-05-08 NOTE — DISCUSSION/SUMMARY
[de-identified] : After discussing various treatment options with the patient including but not limited to oral medications, physical therapy, exercise modalities as well as interventional spinal injections, we have decided with the following plan:\par \par - Continue home exercises, stretching, activity modification, physical therapy, and conservative care.\par - Follow-up as needed.\par

## 2024-07-02 ENCOUNTER — APPOINTMENT (OUTPATIENT)
Dept: PAIN MANAGEMENT | Facility: CLINIC | Age: 79
End: 2024-07-02
Payer: MEDICARE

## 2024-07-02 VITALS — WEIGHT: 122 LBS | BODY MASS INDEX: 19.15 KG/M2 | HEIGHT: 67 IN

## 2024-07-02 DIAGNOSIS — M47.816 SPONDYLOSIS W/OUT MYELOPATHY OR RADICULOPATHY, LUMBAR REGION: ICD-10-CM

## 2024-07-02 DIAGNOSIS — M54.50 LOW BACK PAIN, UNSPECIFIED: ICD-10-CM

## 2024-07-02 PROCEDURE — 99214 OFFICE O/P EST MOD 30 MIN: CPT

## 2024-07-30 ENCOUNTER — APPOINTMENT (OUTPATIENT)
Dept: PAIN MANAGEMENT | Facility: CLINIC | Age: 79
End: 2024-07-30

## 2024-07-30 ENCOUNTER — APPOINTMENT (OUTPATIENT)
Dept: PAIN MANAGEMENT | Facility: CLINIC | Age: 79
End: 2024-07-30
Payer: MEDICARE

## 2024-07-30 DIAGNOSIS — M47.816 SPONDYLOSIS W/OUT MYELOPATHY OR RADICULOPATHY, LUMBAR REGION: ICD-10-CM

## 2024-07-30 PROCEDURE — 64636Z: CUSTOM | Mod: RT

## 2024-07-30 PROCEDURE — J3490M: CUSTOM | Mod: NC

## 2024-07-30 PROCEDURE — 64635 DESTROY LUMB/SAC FACET JNT: CPT | Mod: RT

## 2024-07-30 NOTE — PROCEDURE
[FreeTextEntry3] : Date of Service: 07/30/2024   Account: [id number]  Patient: MAURIZIO FERREIRA   YOB: 1945  Age: 78 year  Surgeon:  Anthony Arreaga D.O.   Assistant:  None  Pre-Operative Diagnosis: Spondylosis of Lumbar Region without Myelopathy or Radiculopathy (M47.816)       Post-Operative Diagnosis: Spondylosis of Lumbar Region without Myelopathy or Radiculopathy (M47.816)       Procedure:  Right L3-L4, L4-5, L5-S1 facet joint radiofrequency ablation with fluoroscopic guidance.  Anesthesia: Local with MAC   This procedure was carried out using fluoroscopic guidance.  The risks and benefits of the procedure were discussed extensively with the patient.  The consent of the patient was obtained and the following procedure was performed. The patient was placed in the prone position on the fluoroscopy table and the area was prepped and draped in a sterile fashion.  A timeout was performed with all essential staff present and the site and side were verified.  Under A/P visualization, the right sacral ala was identified and marked.  Using a 25 gauge needle the skin and subcutaneous structures at this point were localized with approximately 3 mL of 1% Lidocaine.  After this, an 18-gauge 10cm radiofrequency needle with a 10mm curved active tip was inserted under fluoroscopic visualization until the needle made contact with the sacral ala.    The fluoroscope was then redirected under A/P view to visualize the right L3-L4, L4-L5, L5-S1 facet joint levels. The camera was obliqued to approximately 30 degrees to reveal good Sumeet dog anatomical view. The junction of the superior articulate process and transverse process at the targeted levels were then identified and marked. Skin and subcutaneous structures were then anesthetized with approximately 3 mL of 1% Lidocaine at each of these levels.  After this, an 18-gauge 10cm radiofrequency needle with a 10mm curved active tip then advanced until the junction at the SAP and transverse process was met at each level.  Both ipsilateral oblique and lateral fluoroscopic views were obtained in order to advance the needle to the intended targets which was at the junction of the SAP and transverse process.    At all the treated levels, sensory stimulation was performed at 50 Hz not exceeding 1.0 volt and motor stimulation testing at 2 Hz not exceeding 3.0 volts.  There was no abnormal sensory or motor stimulation observed or reported by the patient most notably in the lower extremities.  Each treated level was then anesthetized with approximately 1 ml of 0.25% bupivacaine plus 1.5mg of betamethasone. After which each area was then ablated at 80 degrees centigrade for 90 seconds each. Patient felt no pain reproduction during the ablation procedure.   The needles were subsequently removed.  Vital signs remained normal.  Pulse oximeter was used throughout the procedure and the patient's pulse and oxygen saturation remained within normal limits.  The patient tolerated the procedure well.  There were no complications.  The patient was instructed to apply ice over the injection sites for twenty minutes every two hours for the next 24 to 48 hours.  Anthony Arreaga D.O.

## 2024-08-12 ENCOUNTER — APPOINTMENT (OUTPATIENT)
Dept: PAIN MANAGEMENT | Facility: CLINIC | Age: 79
End: 2024-08-12
Payer: MEDICARE

## 2024-08-12 VITALS — BODY MASS INDEX: 19.15 KG/M2 | WEIGHT: 122 LBS | HEIGHT: 67 IN

## 2024-08-12 DIAGNOSIS — M54.50 LOW BACK PAIN, UNSPECIFIED: ICD-10-CM

## 2024-08-12 PROCEDURE — 99214 OFFICE O/P EST MOD 30 MIN: CPT

## 2024-08-12 NOTE — HISTORY OF PRESENT ILLNESS
[Lower back] : lower back [3] : 3 [0] : 0 [Dull/Aching] : dull/aching [Constant] : constant [Rest] : rest [Injection therapy] : injection therapy [Standing] : standing [] : no [FreeTextEntry1] : right  [de-identified] : july 2021 [de-identified] : lul  [de-identified] : L MRI  [TWNoteComboBox1] : 50%

## 2024-08-12 NOTE — DISCUSSION/SUMMARY
[de-identified] : After discussing various treatment options with the patient including but not limited to oral medications, physical therapy, exercise modalities as well as interventional spinal injections, we have decided with the following plan:  - Continue Home exercises, stretching, activity modification, physical therapy, and conservative care. - MRI report and/or images was reviewed and discussed with the patient. - Recommend L5-S1 Lumbar Epidural Steroid Injection under fluoroscopic guidance with image. (right)  - The risks, benefits and alternatives of the proposed procedure were explained in detail with the patient. The risks outlined include but are not limited to infection, bleeding, post-dural puncture headache, nerve injury, a temporary increase in pain, failure to resolve symptoms, allergic reaction, symptom recurrence, and possible elevation of blood sugar in diabetics. All questions were answered to patient's apparent satisfaction and he/she verbalized an understanding. - Patient is presenting with acute/sub-acute radicular pain with impairment in ADLs and functionality.  The pain has not responded to conservative care including NSAID therapy and/or physical therapy.  There is no bleeding tendency, unstable medical condition, or systemic infection. - Follow up in 1-2 weeks post injection for re-evaluation.

## 2024-08-12 NOTE — PHYSICAL EXAM
[Extension] : extension [de-identified] : Constitutional; Appears well, no apparent distress\par  Ability to communicate: Normal \par  Respiratory: non-labored breathing\par  Skin: No rash noted\par  Head: Normocephalic, atraumatic\par  Neck: no visible thyroid enlargement\par  Eyes: Extraocular movements intact\par  Neurologic: Alert and oriented x3\par  Psychiatric: normal mood, affect and behavior \par  \par   [] : negative Stearns maneuver facet loading [de-identified] : +FADIR on the right

## 2024-08-20 ENCOUNTER — APPOINTMENT (OUTPATIENT)
Dept: PAIN MANAGEMENT | Facility: CLINIC | Age: 79
End: 2024-08-20
Payer: MEDICARE

## 2024-08-20 DIAGNOSIS — M54.16 RADICULOPATHY, LUMBAR REGION: ICD-10-CM

## 2024-08-20 PROCEDURE — 62323 NJX INTERLAMINAR LMBR/SAC: CPT

## 2024-08-20 NOTE — PROCEDURE
[FreeTextEntry3] : Date of Service: 08/20/2024   Account: [id number]  Patient: MAURIZIO FERREIRA   YOB: 1945  Age: 78 year   Surgeon:                                                         Anthony Arreaga D.O.  Pre-Operative Diagnosis:                             Lumbosacral radiculitis  Post-Operative Diagnosis:                           Same  Procedure:                                                      Interlaminar lumbar epidural steroid injection (L5-S1) under fluoroscopic guidance  Anesthesia:                                                     Local with MAC   This procedure was carried out using fluoroscopic guidance.  The risks and benefits of the procedure were discussed extensively with the patient.  The consent of the patient was obtained and the following procedure was performed.  The patient was placed in the prone position.  The lumbar area was prepped and draped in a sterile fashion.  A timeout was performed with all essential staff present and the site and side were verified. Under AP view with slight cephalad-caudad angulation, the L5-S1 interspace was identified and marked.  Using sterile technique, the superficial skin was anesthetized with 1% Lidocaine without epinephrine.  A 20-gauge Tuohy needle was advanced into the epidural space under fluoroscopy using [id number] technique and using loss of resistance at the L5-S1 level.  After negative aspiration for heme or CSF, an epidurogram was obtained using 2-3 cc Omnipaque contrast injected under live fluoroscopy, confirming epidural placement of the needle.    Epidurogram showed no evidence of intrathecal or intravascular flow, and good evidence of bilateral epidural flow from L3-S2 levels.  After this, 4 cc of preservative free normal saline plus 12 mg of betamethasone were injected into the epidural space.  The needle was subsequently removed.  Anesthesia personnel were present throughout the procedure.  The patient tolerated the procedure well and was instructed to contact me immediately if there were any problems.  Anthony Arreaga D.O.

## 2024-09-16 ENCOUNTER — APPOINTMENT (OUTPATIENT)
Dept: PAIN MANAGEMENT | Facility: CLINIC | Age: 79
End: 2024-09-16
Payer: MEDICARE

## 2024-09-16 VITALS — HEIGHT: 67 IN | BODY MASS INDEX: 18.83 KG/M2 | WEIGHT: 120 LBS

## 2024-09-16 DIAGNOSIS — M51.26 OTHER INTERVERTEBRAL DISC DISPLACEMENT, LUMBAR REGION: ICD-10-CM

## 2024-09-16 DIAGNOSIS — M54.50 LOW BACK PAIN, UNSPECIFIED: ICD-10-CM

## 2024-09-16 DIAGNOSIS — M54.16 RADICULOPATHY, LUMBAR REGION: ICD-10-CM

## 2024-09-16 PROCEDURE — 99214 OFFICE O/P EST MOD 30 MIN: CPT

## 2024-09-16 RX ORDER — DICLOFENAC SODIUM 75 MG/1
75 TABLET, DELAYED RELEASE ORAL
Qty: 60 | Refills: 0 | Status: ACTIVE | COMMUNITY
Start: 2024-09-16 | End: 1900-01-01

## 2024-09-16 NOTE — PHYSICAL EXAM
[Extension] : extension [de-identified] : Constitutional; Appears well, no apparent distress\par  Ability to communicate: Normal \par  Respiratory: non-labored breathing\par  Skin: No rash noted\par  Head: Normocephalic, atraumatic\par  Neck: no visible thyroid enlargement\par  Eyes: Extraocular movements intact\par  Neurologic: Alert and oriented x3\par  Psychiatric: normal mood, affect and behavior \par  \par   [] : negative Stearns maneuver facet loading [de-identified] : +FADIR on the right

## 2024-09-16 NOTE — DISCUSSION/SUMMARY
[de-identified] : After discussing various treatment options with the patient including but not limited to oral medications, physical therapy, exercise modalities as well as interventional spinal injections, we have decided with the following plan:  - Continue home exercises, stretching, activity modification, physical therapy, and conservative care. - Follow-up as needed. - Will provide prescription for Physical Therapy. - Recommend Diclofenac 75mg BID PRN. Potential adverse effects including but not limited to bleeding, ulcers, increased risk of hypertension, heart disease, kidney disease and stroke were discussed with the patient.  Medication would allow patient to be more mobile and perform ADL's.  Will continue to monitor patient and attempt to wean as soon as possible. Will use the lowest dosage possible for the shortest possible period of time.

## 2024-09-16 NOTE — DISCUSSION/SUMMARY
[de-identified] : After discussing various treatment options with the patient including but not limited to oral medications, physical therapy, exercise modalities as well as interventional spinal injections, we have decided with the following plan:  - Continue home exercises, stretching, activity modification, physical therapy, and conservative care. - Follow-up as needed. - Will provide prescription for Physical Therapy. - Recommend Diclofenac 75mg BID PRN. Potential adverse effects including but not limited to bleeding, ulcers, increased risk of hypertension, heart disease, kidney disease and stroke were discussed with the patient.  Medication would allow patient to be more mobile and perform ADL's.  Will continue to monitor patient and attempt to wean as soon as possible. Will use the lowest dosage possible for the shortest possible period of time.

## 2024-09-16 NOTE — HISTORY OF PRESENT ILLNESS
[Lower back] : lower back [3] : 3 [0] : 0 [Dull/Aching] : dull/aching [Constant] : constant [Rest] : rest [Injection therapy] : injection therapy [Standing] : standing [TWNoteComboBox1] : 50% [] : no [FreeTextEntry1] : right  [de-identified] : july 2021 [de-identified] : lul  [de-identified] : L [facility]

## 2024-09-16 NOTE — PHYSICAL EXAM
[Extension] : extension [de-identified] : Constitutional; Appears well, no apparent distress\par  Ability to communicate: Normal \par  Respiratory: non-labored breathing\par  Skin: No rash noted\par  Head: Normocephalic, atraumatic\par  Neck: no visible thyroid enlargement\par  Eyes: Extraocular movements intact\par  Neurologic: Alert and oriented x3\par  Psychiatric: normal mood, affect and behavior \par  \par   [] : negative Stearns maneuver facet loading [de-identified] : +FADIR on the right

## 2024-09-16 NOTE — HISTORY OF PRESENT ILLNESS
[Lower back] : lower back [3] : 3 [0] : 0 [Dull/Aching] : dull/aching [Constant] : constant [Rest] : rest [Injection therapy] : injection therapy [Standing] : standing [TWNoteComboBox1] : 50% [] : no [FreeTextEntry1] : right  [de-identified] : july 2021 [de-identified] : lul  [de-identified] : L [facility]

## 2025-05-27 ENCOUNTER — NON-APPOINTMENT (OUTPATIENT)
Age: 80
End: 2025-05-27

## 2025-08-01 ENCOUNTER — APPOINTMENT (OUTPATIENT)
Dept: ORTHOPEDIC SURGERY | Facility: CLINIC | Age: 80
End: 2025-08-01
Payer: MEDICARE

## 2025-08-01 VITALS — WEIGHT: 120 LBS | BODY MASS INDEX: 18.83 KG/M2 | HEIGHT: 67 IN

## 2025-08-01 DIAGNOSIS — M47.812 SPONDYLOSIS W/OUT MYELOPATHY OR RADICULOPATHY, CERVICAL REGION: ICD-10-CM

## 2025-08-01 DIAGNOSIS — J44.9 CHRONIC OBSTRUCTIVE PULMONARY DISEASE, UNSPECIFIED: ICD-10-CM

## 2025-08-01 PROCEDURE — 99214 OFFICE O/P EST MOD 30 MIN: CPT

## 2025-08-01 PROCEDURE — 72050 X-RAY EXAM NECK SPINE 4/5VWS: CPT

## 2025-08-01 RX ORDER — METHYLPREDNISOLONE 4 MG/1
4 TABLET ORAL
Qty: 1 | Refills: 1 | Status: ACTIVE | COMMUNITY
Start: 2025-08-01 | End: 1900-01-01

## 2025-08-01 RX ORDER — METHYLPREDNISOLONE 4 MG/1
4 TABLET ORAL
Qty: 1 | Refills: 0 | Status: ACTIVE | COMMUNITY
Start: 2025-08-01 | End: 1900-01-01